# Patient Record
(demographics unavailable — no encounter records)

---

## 2024-11-05 NOTE — HISTORY OF PRESENT ILLNESS
[FreeTextEntry1] : Patient seen s/p right foot TMA revision, closed with sutures at this time. pt also seen for left foot diabetic ulcer down to skin, subcutaneous tissue, fat. Pt currently on IV abx for osteomyelitis. Patient relates that she is doing well at this time and is currently on IV antibiotics for osteomyelitis.

## 2024-11-05 NOTE — PLAN
[FreeTextEntry1] : Patient examined and evaluated at this time. Continue local wound care and offloading. IV abx as per infectious disease. Spent 30 minutes for patient care and medical decision making. Patient to follow up in 1 week.

## 2024-11-05 NOTE — PHYSICAL EXAM
[0] : left 0 [1+] : left 1+ [Skin Ulcer] : ulcer [Alert] : alert [Oriented to Person] : oriented to person [Oriented to Place] : oriented to place [Calm] : calm [Ankle Swelling (On Exam)] : not present [Varicose Veins Of Lower Extremities] : not present [] : not present [Purpura] : no purpura  [Petechiae] : no petechiae [Skin Induration] : no induration [de-identified] : A&Ox3, NAD, morbid obesity [de-identified] : HTN, HLD [de-identified] : Bilateral transmetatarsal amputation sites, healed.  Status post left Achilles tendon lengthening. [de-identified] : right foot tma revision site with intact sutures. left foot ulcer down to skin, subcutaneous tissue, fat. [de-identified] : Bilateral loss of light touch sensation [Abdominal Pad] : Abdominal Pad [4 x 4] : 4 x 4  [de-identified] : All sutures in place [FreeTextEntry1] : Right Foot [FreeTextEntry2] : 19.0 Suture line [de-identified] : Sanguinous  [de-identified] : TMA Revision 10/25/24 [de-identified] : Mild [de-identified] : Adaptic touch, Silver Alginate [de-identified] :  Mechanically cleansed with NS 0.9%, Sterile Gauze Kerlix Surgical Shoe   Dorsalis Pedis: R Palpable, Regular, 2+ L Palpable, Regular, 2+ Posterior Tibialis: R Palpable, Regular, 2+ L Palpable, Regular, 2+ Extremity Color: Pigmented Extremity Temperature: Warm Capillary Refill: < 3 seconds bilaterally Vascular cleared in house during surgery  [FreeTextEntry7] : Left Plantar Foot [FreeTextEntry8] : 0.3 [FreeTextEntry9] : 0.3 [de-identified] : 0.4 [de-identified] : Serosanguinous  [de-identified] : Silver Alginate [de-identified] :  Mechanically cleansed with NS 0.9%, Sterile Gauze Kerlix [TWNoteComboBox4] : Moderate [TWNoteComboBox5] : No [TWNoteComboBox6] : Surgical [de-identified] : No [de-identified] : Macerated [de-identified] : None [de-identified] : None [de-identified] : 3x Weekly [de-identified] : Primary Dressing [de-identified] : Moderate [de-identified] : No [de-identified] : Diabetic [de-identified] : No [de-identified] : Normal [de-identified] : None [de-identified] : None [de-identified] : 100% [de-identified] : 3x Weekly [de-identified] : Primary Dressing

## 2024-11-05 NOTE — REVIEW OF SYSTEMS
[Joint Stiffness] : joint stiffness [Skin Wound] : skin wound [Fever] : no fever [Chills] : no chills [Eye Pain] : no eye pain [Loss Of Hearing] : no hearing loss [Shortness Of Breath] : no shortness of breath [Wheezing] : no wheezing [Abdominal Pain] : no abdominal pain [Anxiety] : no anxiety [Easy Bleeding] : no tendency for easy bleeding [FreeTextEntry5] : HTN, HLD [FreeTextEntry9] : TMA bilateral , in equinus  [de-identified] : DFU 2 right plantar 1st metatarsal , , left foot DFU 2 plantar mid forefoot , no soi  [de-identified] : IDDM with neuropathy  [de-identified] : NIDDM

## 2024-11-05 NOTE — ASSESSMENT
[Verbal] : Verbal [Written] : Written [Demo] : Demo [Patient] : Patient [Good - alert, interested, motivated] : Good - alert, interested, motivated [Verbalizes knowledge/Understanding] : Verbalizes knowledge/understanding [Dressing changes] : dressing changes [Foot Care] : foot care [Skin Care] : skin care [Signs and symptoms of infection] : sign and symptoms of infection [Nutrition] : nutrition [How and When to Call] : how and when to call [Pain Management] : pain management [Off-loading] : off-loading [Patient responsibility to plan of care] : patient responsibility to plan of care [Glycemic Control] : glycemic control [] : Yes [Stable] : stable [Home] : Home [Faxed - Long Term Care/Home Health Agency] : Long Term Care/Home Health Agency: Faxed [FreeTextEntry2] : Infection prevention wound care Maintain optimal Skin Integrity to high pressure areas. Nutrition and Wound Healing Elevation and low sodium compliance Pressure relief/Pressure redistribution    [FreeTextEntry3] : Initial Visit [FreeTextEntry4] : Follow up in 1 week Patient is registered with [facility] for dressing changes, signed WC instructions and script for supplies sent over and copy of instructions given to patient. Pt stated she has some supplies at home. [Walker] : Walker [FreeTextEntry1] : [facility]

## 2024-11-09 NOTE — ASSESSMENT
[Verbal] : Verbal [Written] : Written [Demo] : Demo [Patient] : Patient [Good - alert, interested, motivated] : Good - alert, interested, motivated [Verbalizes knowledge/Understanding] : Verbalizes knowledge/understanding [Dressing changes] : dressing changes [Foot Care] : foot care [Skin Care] : skin care [Signs and symptoms of infection] : sign and symptoms of infection [Nutrition] : nutrition [How and When to Call] : how and when to call [Pain Management] : pain management [Off-loading] : off-loading [Patient responsibility to plan of care] : patient responsibility to plan of care [Glycemic Control] : glycemic control [Stable] : stable [Home] : Home [Walker] : Walker [Faxed - Long Term Care/Home Health Agency] : Long Term Care/Home Health Agency: Faxed [] : No [FreeTextEntry2] : Infection prevention wound care Maintain optimal Skin Integrity to high pressure areas. Nutrition and Wound Healing Elevation and low sodium compliance Pressure relief/Pressure redistribution    [FreeTextEntry3] : Pt with a large amount of sanguinous drainage for the last 2 days [FreeTextEntry4] : Pt to come to  for dressing changes Sat and Monday to be able to keep an eye on the new bleeding the patient is experiencing. Follow up on Wed for an assessment.  Patient is registered with [facility] for dressing changes, Pt called to notify them they do not need to come until Fri next week. [FreeTextEntry1] : [facility]

## 2024-11-09 NOTE — PLAN
[FreeTextEntry1] : Patient examined and evaluated at this time. Applied Nunit and silver alginate to the right foot. Advised patient to limit ambulation to only necessary activities with aid of walker and surgical shoe with weight bearing to the heel.  Continue local wound care and offloading. Patient to return to [facility] on Sat and Mon to assess for drainage vs bleeding from surgical site  IV abx as per infectious disease. Spent 20 minutes for patient care and medical decision making. Patient to follow up in 1 week.

## 2024-11-09 NOTE — HISTORY OF PRESENT ILLNESS
[FreeTextEntry1] : Patient seen s/p right foot TMA revision, closed with sutures at this time. pt also seen for left foot diabetic ulcer down to skin, subcutaneous tissue, fat. Pt currently on ICC line  IV abx for osteomyelitis. Patient relates that she walked to in the house and noticed blood through the dressings. Patient also relates the blood was pooling on the floors. Patient was seen at the [facility] today with strkethrough to the dressing.

## 2024-11-09 NOTE — REVIEW OF SYSTEMS
[Fever] : no fever [Chills] : no chills [Eye Pain] : no eye pain [Loss Of Hearing] : no hearing loss [Shortness Of Breath] : no shortness of breath [Wheezing] : no wheezing [Abdominal Pain] : no abdominal pain [Joint Stiffness] : joint stiffness [Skin Wound] : skin wound [Anxiety] : no anxiety [Easy Bleeding] : no tendency for easy bleeding [FreeTextEntry5] : HTN, HLD [FreeTextEntry9] : TMA bilateral , in equinus  [de-identified] : s/p revision TMA of the right foot and plantr left foot wound  [de-identified] : IDDM with neuropathy  [de-identified] : NIDDM

## 2024-11-09 NOTE — ASSESSMENT
[Verbal] : Verbal [Written] : Written [Demo] : Demo [Patient] : Patient [Good - alert, interested, motivated] : Good - alert, interested, motivated [Verbalizes knowledge/Understanding] : Verbalizes knowledge/understanding [Dressing changes] : dressing changes [Foot Care] : foot care [Skin Care] : skin care [Signs and symptoms of infection] : sign and symptoms of infection [Nutrition] : nutrition [How and When to Call] : how and when to call [Pain Management] : pain management [Off-loading] : off-loading [Patient responsibility to plan of care] : patient responsibility to plan of care [Glycemic Control] : glycemic control [Stable] : stable [Home] : Home [Walker] : Walker [Faxed - Long Term Care/Home Health Agency] : Long Term Care/Home Health Agency: Faxed [] : No [FreeTextEntry2] : Infection prevention wound care Maintain optimal Skin Integrity to high pressure areas. Nutrition and Wound Healing Elevation and low sodium compliance Pressure relief/Pressure redistribution    [FreeTextEntry3] : Pt with a large amount of sanguinous drainage for the last 2 days [FreeTextEntry4] : Pt to come to  for dressing changes Sat and Monday to be able to keep an eye on the new bleeding the patient is experiencing. Follow up on Wed for an assessment.  Patient is registered with [facility] for dressing changes, Pt called to notify them they do not need to come until Fri next week. [FreeTextEntry1] : [facility]

## 2024-11-09 NOTE — REVIEW OF SYSTEMS
[Fever] : no fever [Chills] : no chills [Eye Pain] : no eye pain [Loss Of Hearing] : no hearing loss [Shortness Of Breath] : no shortness of breath [Wheezing] : no wheezing [Abdominal Pain] : no abdominal pain [Joint Stiffness] : joint stiffness [Skin Wound] : skin wound [Anxiety] : no anxiety [Easy Bleeding] : no tendency for easy bleeding [FreeTextEntry5] : HTN, HLD [FreeTextEntry9] : TMA bilateral , in equinus  [de-identified] : s/p revision TMA of the right foot and plantr left foot wound  [de-identified] : IDDM with neuropathy  [de-identified] : NIDDM

## 2024-11-09 NOTE — PHYSICAL EXAM
[Abdominal Pad] : Abdominal Pad [4 x 4] : 4 x 4  [0] : left 0 [1+] : left 1+ [Ankle Swelling (On Exam)] : not present [Varicose Veins Of Lower Extremities] : not present [] : not present [Purpura] : no purpura  [Petechiae] : no petechiae [Skin Ulcer] : ulcer [Skin Induration] : no induration [Alert] : alert [Oriented to Person] : oriented to person [Oriented to Place] : oriented to place [Calm] : calm [de-identified] : A&Ox3, NAD, morbid obesity [de-identified] : HTN, HLD [de-identified] : Bilateral transmetatarsal amputation sites, healed.  Status post left Achilles tendon lengthening. [de-identified] : right foot TMA  revision site with central dehiscence, no active bleeding noted. left foot ulcer down to skin, subcutaneous tissue, fat. [de-identified] : Bilateral loss of light touch sensation [FreeTextEntry1] : Right Foot [FreeTextEntry2] : 19.0 Suture line [de-identified] : Sanguinous  [de-identified] : TMA Revision 10/25/24 [de-identified] : Mild [de-identified] : Nu-Knit, Silver Alginate [de-identified] :  Mechanically cleansed with NS 0.9%, [facility]    [FreeTextEntry7] : Left Plantar Foot [FreeTextEntry8] : 0.3 [FreeTextEntry9] : 0.3 [de-identified] : 0.4 [de-identified] : Serosanguinous  [de-identified] : Silver Alginate [de-identified] :  Mechanically cleansed with NS 0.9%, Sterile Gauze Kerlix [TWNoteComboBox4] : Large [TWNoteComboBox5] : No [TWNoteComboBox6] : Surgical [de-identified] : No [de-identified] : Macerated [de-identified] : None [de-identified] : None [de-identified] : 3x Weekly [de-identified] : Primary Dressing [de-identified] : Moderate [de-identified] : No [de-identified] : Diabetic [de-identified] : No [de-identified] : Normal [de-identified] : None [de-identified] : None [de-identified] : 100% [de-identified] : 3x Weekly [de-identified] : Primary Dressing

## 2024-11-09 NOTE — HISTORY OF PRESENT ILLNESS
[FreeTextEntry1] : Patient seen s/p right foot TMA revision, closed with sutures at this time. pt also seen for left foot diabetic ulcer down to skin, subcutaneous tissue, fat. Pt currently on ICC line  IV abx for osteomyelitis. Patient relates that she walked to in the house and noticed blood through the dressings. Patient also relates the blood was pooling on the floors. Patient was seen at the [facility] today with strkethrough to the dressing.

## 2024-11-09 NOTE — PHYSICAL EXAM
[Abdominal Pad] : Abdominal Pad [4 x 4] : 4 x 4  [0] : left 0 [1+] : left 1+ [Ankle Swelling (On Exam)] : not present [Varicose Veins Of Lower Extremities] : not present [] : not present [Purpura] : no purpura  [Petechiae] : no petechiae [Skin Ulcer] : ulcer [Skin Induration] : no induration [Alert] : alert [Oriented to Person] : oriented to person [Oriented to Place] : oriented to place [Calm] : calm [de-identified] : A&Ox3, NAD, morbid obesity [de-identified] : HTN, HLD [de-identified] : Bilateral transmetatarsal amputation sites, healed.  Status post left Achilles tendon lengthening. [de-identified] : right foot TMA  revision site with central dehiscence, no active bleeding noted. left foot ulcer down to skin, subcutaneous tissue, fat. [de-identified] : Bilateral loss of light touch sensation [FreeTextEntry1] : Right Foot [FreeTextEntry2] : 19.0 Suture line [de-identified] : Sanguinous  [de-identified] : TMA Revision 10/25/24 [de-identified] : Mild [de-identified] : Nu-Knit, Silver Alginate [de-identified] :  Mechanically cleansed with NS 0.9%, [facility]    [FreeTextEntry7] : Left Plantar Foot [FreeTextEntry8] : 0.3 [FreeTextEntry9] : 0.3 [de-identified] : 0.4 [de-identified] : Serosanguinous  [de-identified] : Silver Alginate [de-identified] :  Mechanically cleansed with NS 0.9%, Sterile Gauze Kerlix [TWNoteComboBox4] : Large [TWNoteComboBox5] : No [TWNoteComboBox6] : Surgical [de-identified] : No [de-identified] : Macerated [de-identified] : None [de-identified] : None [de-identified] : 3x Weekly [de-identified] : Primary Dressing [de-identified] : Moderate [de-identified] : No [de-identified] : Diabetic [de-identified] : No [de-identified] : Normal [de-identified] : None [de-identified] : None [de-identified] : 100% [de-identified] : 3x Weekly [de-identified] : Primary Dressing

## 2024-11-09 NOTE — PLAN
[FreeTextEntry1] : Patient examined and evaluated at this time. Applied Nunit and silver alginate to the right foot. Advised patient to limit ambulation to only necessary activities with aid of walker and surgical shoe with weight bearing to the heel.  Continue local wound care and offloading. Patient to return to [facility] on Sat and Mon to assess for drainage vs bleeding from surgical site  IV abx as per infectious disease. Spent 20 minutes for patient care and medical decision making. Patient to follow up in 1 week.

## 2024-11-22 NOTE — PLAN
[FreeTextEntry1] : Patient examined and evaluated at this time. Advised patient to limit ambulation to only necessary activities with aid of walker and surgical shoe with weight bearing to the heel.  Continue local wound care and offloading.  IV abx as per infectious disease. Spent 20 minutes for patient care and medical decision making. Patient to follow up in 1 week.

## 2024-11-22 NOTE — PHYSICAL EXAM
[4 x 4] : 4 x 4  [Abdominal Pad] : Abdominal Pad [0] : left 0 [1+] : left 1+ [Ankle Swelling (On Exam)] : not present [Varicose Veins Of Lower Extremities] : not present [] : not present [Purpura] : no purpura  [Petechiae] : no petechiae [Skin Ulcer] : ulcer [Skin Induration] : no induration [Alert] : alert [Oriented to Person] : oriented to person [Oriented to Place] : oriented to place [Calm] : calm [de-identified] : A&Ox3, NAD, morbid obesity [de-identified] : HTN, HLD [de-identified] : Bilateral transmetatarsal amputation sites, healed.  Status post left Achilles tendon lengthening. [de-identified] : right foot TMA  revision site with central dehiscence healed. left foot ulcer down to skin, subcutaneous tissue, fat. [de-identified] : Bilateral loss of light touch sensation [FreeTextEntry1] : Right foot TMA site - Sutures intact  [de-identified] : TMA Revision 10/25/24  [de-identified] : Dry flaky george wound  [de-identified] : Adaptic touch, Silver alginate [de-identified] : Mechanically cleansed with sterile gauze and normal saline. Kerlix   some sutures removed by DPM  [FreeTextEntry7] : left plantar foot  [FreeTextEntry8] : 0.3 [FreeTextEntry9] : 0.3 [de-identified] : 0.2  [de-identified] : scant Serous/sanguinous [de-identified] : callus  [de-identified] : Allevyn pad  [de-identified] : Mechanically cleansed with sterile gauze and normal saline. Cloth tape   Callus shaved by LACEY  [TWNoteComboBox4] : None [TWNoteComboBox6] : Surgical [de-identified] : other [de-identified] : None [de-identified] : None [de-identified] : No [de-identified] : Every other day [de-identified] : Primary Dressing [de-identified] : other [de-identified] : None [de-identified] : None [de-identified] : 100% [de-identified] : No [de-identified] : Every other day [de-identified] : Primary Dressing

## 2024-11-22 NOTE — REVIEW OF SYSTEMS
[Fever] : no fever [Chills] : no chills [Eye Pain] : no eye pain [Loss Of Hearing] : no hearing loss [Shortness Of Breath] : no shortness of breath [Wheezing] : no wheezing [Abdominal Pain] : no abdominal pain [Joint Stiffness] : joint stiffness [Skin Wound] : skin wound [Anxiety] : no anxiety [Easy Bleeding] : no tendency for easy bleeding [FreeTextEntry5] : HTN, HLD [FreeTextEntry9] : TMA bilateral , in equinus  [de-identified] : s/p revision TMA of the right foot and plantr left foot wound  [de-identified] : IDDM with neuropathy  [de-identified] : NIDDM

## 2024-11-22 NOTE — ASSESSMENT
[Verbal] : Verbal [Written] : Written [Demo] : Demo [Patient] : Patient [Good - alert, interested, motivated] : Good - alert, interested, motivated [Needs reinforcement] : needs reinforcement [Dressing changes] : dressing changes [Foot Care] : foot care [Skin Care] : skin care [Signs and symptoms of infection] : sign and symptoms of infection [Nutrition] : nutrition [How and When to Call] : how and when to call [Pain Management] : pain management [Patient responsibility to plan of care] : patient responsibility to plan of care [Glycemic Control] : glycemic control [] : Yes [Stable] : stable [Home] : Home [Ambulatory] : Ambulatory [Not Applicable - Long Term Care/Home Health Agency] : Long Term Care/Home Health Agency: Not Applicable [FreeTextEntry2] : Infection prevention Localized wound care  Goal remaining pain free regarding wounds Offloading  Weight loss   Promote optimal skin care and nutrition. [FreeTextEntry4] : Pt provides her own dressing changes and has supplies at home.  Follow up in 1 week

## 2024-11-22 NOTE — HISTORY OF PRESENT ILLNESS
[FreeTextEntry1] : Patient seen s/p right foot TMA revision, closed with sutures at this time. pt also seen for left foot diabetic ulcer down to skin, subcutaneous tissue, fat. Pt currently on ICC line  IV abx for osteomyelitis.

## 2024-11-30 NOTE — REVIEW OF SYSTEMS
[Fever] : no fever [Chills] : no chills [Eye Pain] : no eye pain [Loss Of Hearing] : no hearing loss [Shortness Of Breath] : no shortness of breath [Wheezing] : no wheezing [Abdominal Pain] : no abdominal pain [Joint Stiffness] : joint stiffness [Skin Wound] : skin wound [Anxiety] : no anxiety [Easy Bleeding] : no tendency for easy bleeding [FreeTextEntry5] : HTN, HLD [FreeTextEntry9] : TMA bilateral , in equinus  [de-identified] : s/p revision TMA of the right foot and plantr left foot wound  [de-identified] : IDDM with neuropathy  [de-identified] : NIDDM

## 2024-11-30 NOTE — PLAN
PROGRAM GOALS from SURGEON    Anjali has the potential to become an appropriate candidate for weight loss surgery.  They have chosen the sleeve gastrectomy surgery 43410 and I agree.  They must fulfill the following goals in order to undergo bariatric surgery:    Psychology Evaluation- You will need to see a clinical Psychologist or a Psychiatrist in order to be cleared for weight loss surgery.  Please call Psychologist or licensed clinical  Jason Nava and team, [place] [phone], Jennifer Liu and team, [place] [phone], Zach Muhammad [place] [phone], Skip Syed and team, [place] [phone], Noel Casanova and team, [place] [phone], Jordan Desir, [place] [phone], or Alex Victoria [place] [phone]    Follow Up Visits - Your follow-up care is very important to the success of your procedure.  We will see you monthly and then:  For the gastric bypass and sleeve---Year 1 - At least 6 times (1-2 weeks, 1, 3, 6, 9 and 12 months after surgery).  ---Year 2 - At least 3-4 times  Labs: you will need labs to check for iron or vitamin deficiencies every 6 months for the first 2 years and then yearly.  For the Lap-BAND  ---Year 1 - Approximately 9 times on the average (the additional visits are to adjust the BAND and also: 1-2 weeks,1, 2, 3, 6, 9 and 12 months after surgery)  ---Year 2 - At least 3-4 times  ---Labs: you will need labs to check for iron or vitamin deficiencies every 6 months for the first 2 years and then yearly     Subsequent Years for the Gastric bypass, Sleeve or Lap-BAND - At 1-2 times Annually     Pills - You will not be able to take any solid pills for six months after surgery as your new stomach heals.  Pills include prescription medications, over the counter medications (like Tylenol) as well as vitamins.  The only exception would be very small pills such as birth control pills or Synthroid which are  approximately 3/8 inch in diameter or smaller for 6 months then 3/4 of an inch for the rest of your life .        TO DO LIST:    EKG prior to surgery This is good for 6 months  You will need an upper endoscopy with Dr. El Johnson at [facility].  We will help set this up for you.  Laboratory Blood Tests - Labs have been entered in Epic.  Please get these tests done but you need to be :12 HOUR FASTING AND NO VITAMINS FOR 3 DAYS  Pulmonary Function test   Indirect Calorimetry-   Please schedule at [facility], [facility], [facility] or our office  Chest X-Ray - You will need to have a chest x-ray taken Prior to surgery This is good for 6 months  Ultrasound of the Gallbladder - Please obtain an Ultrasound of the Gallbladder prior to surgery  We need your CPAP settings    The following tests and labs are in EPIC:  No orders of the defined types were placed in this encounter.     Pregnancy - You are at an increased risk for complications during pregnancy after surgery mainly due to decreased calorie intake and nutritional changes.  This risk is most pronounced during the first two years after weight loss surgery and it is recommended that you do not get pregnant during this time.  After two years it is possible to have a normal pregnancy; however, it is suggested that you consult with your obstetrician and a dietitian prior to becoming pregnant to discuss a nutritional plan with respect to your limited food and vitamin intake.   If you had any bariatric surgery you should call our office as soon as you are aware you are pregnant.      Informational - Be aware of the following key points:    Goals - If your goals are not met, your surgery date may be cancelled or postponed until they are completed.  Working after surgery - The normal time off from work is 1-2 full weeks.  If you require a note for work please let us know.  Lifting after surgery - You will not be able to lift  more than 20 lb for at least 4 weeks after surgery.    Surgical date - After your insurance has cleared, you will be contacted (normally by phone) by a member of our office to schedule your surgery.   Final clearance of your surgical procedure is dependent on:  Completion of your goals    Insurance clearance    Hospital admitting interview    Paperwork (read this section carefully) - For non-[facility] tests/labs or other results. You are required to bring copies of all paperwork to the next visit.  Do not rely on any other doctors’ offices, hospitals or facilities to mail or Fax test results to our office.  It is your obligation to obtain copies, and bring them to the next visit even if other facilities mail or fax copies to our office.  Please bring a copy of your exercise and food records also.     Please understand that if these items are not completed within a reasonable time before your surgical date, your surgery will be postponed pending completion.     You will see us next month then please see your primary care monthly for the next 4 months and then you will see us.  Please discuss with your primary care that they should include your weight, eating plan and activity and exercise.      You will need an upper endoscopy with Dr. El Johnson at [facility].  We will help set this up for you.     Nutrition Goals:  1. Review the Pre-Operative Nutrition Plan/Packet.  2. Consider tracking in an angel like [employer] or [employer]. Aim to log 3-4 complete days before your next appointment so that we can review them together during your next appointment.  3. Aim to eat 5-6 small, high protein meals/snacks every 2-3 hours per day - do not skip.  4. Eliminate juice. Eat whole fruit instead.    EXERCISE PLAN:  An exercise plan was discussed with the doctor for Anjali to add an additional exercise such as cross training between pulleys, hammer strength exercises, and kettle bell training for functional strength.      GOALS:  Cross train between treadmill, elliptical , and rowing machine followed by circuit exercises, pulleys, kettle bell training, and hammer strength exercises for resistance.      THR:  135-155 beats/min during cardiovascular exercise:      We will discuss scheduling for an RMR to test metabolism by next follow up.    [FreeTextEntry1] : Patient examined and evaluated at this time. Advised patient to limit ambulation to only necessary activities with aid of walker and surgical shoe with weight bearing to the heel.  Continue local wound care and offloading.  IV abx as per infectious disease. Spent 20 minutes for patient care and medical decision making. Patient to follow up in 1 week.

## 2024-11-30 NOTE — PHYSICAL EXAM
[4 x 4] : 4 x 4  [Abdominal Pad] : Abdominal Pad [0] : left 0 [1+] : left 1+ [Ankle Swelling (On Exam)] : not present [Varicose Veins Of Lower Extremities] : not present [] : not present [Purpura] : no purpura  [Petechiae] : no petechiae [Skin Ulcer] : ulcer [Skin Induration] : no induration [Alert] : alert [Oriented to Person] : oriented to person [Oriented to Place] : oriented to place [Calm] : calm [de-identified] : A&Ox3, NAD, morbid obesity [de-identified] : HTN, HLD [de-identified] : Bilateral transmetatarsal amputation sites, healed.  Status post left Achilles tendon lengthening. [de-identified] : right foot TMA  revision site with central dehiscence sutures intact to the rest of the foot.  left foot wound plantar aspect with granular wound base             ulcer down to skin, subcutaneous tissue, fat. [de-identified] : Bilateral loss of light touch sensation [FreeTextEntry1] : Right foot TMA site - Sutures intact  [de-identified] : TMA Revision 10/25/24  [de-identified] : Dry flaky george wound  [de-identified] : Adaptic touch, Silver alginate [de-identified] : Mechanically cleansed with sterile gauze and normal saline. Kerlix   some sutures removed by DPM  [FreeTextEntry7] : left plantar foot  [FreeTextEntry8] : 0.3 [FreeTextEntry9] : 0.3 [de-identified] : 0.2  [de-identified] : scant Serous/sanguinous [de-identified] : callus  [de-identified] : Allevyn pad  [de-identified] : Mechanically cleansed with sterile gauze and normal saline.  Cloth tape   Callus shaved by LACEY  [TWNoteComboBox4] : None [TWNoteComboBox6] : Surgical [de-identified] : other [de-identified] : None [de-identified] : None [de-identified] : No [de-identified] : Every other day [de-identified] : Primary Dressing [de-identified] : other [de-identified] : None [de-identified] : None [de-identified] : 100% [de-identified] : No [de-identified] : Every other day [de-identified] : Primary Dressing

## 2024-11-30 NOTE — VITALS
[Pain related to present condition?] : The patient's  pain is not related to present condition. [] : No [de-identified] : Patient has no pain related to wounds.

## 2024-11-30 NOTE — ASSESSMENT
[Verbal] : Verbal [Written] : Written [Demo] : Demo [Patient] : Patient [Good - alert, interested, motivated] : Good - alert, interested, motivated [Needs reinforcement] : needs reinforcement [Dressing changes] : dressing changes [Foot Care] : foot care [Skin Care] : skin care [Signs and symptoms of infection] : sign and symptoms of infection [Nutrition] : nutrition [How and When to Call] : how and when to call [Pain Management] : pain management [Patient responsibility to plan of care] : patient responsibility to plan of care [Glycemic Control] : glycemic control [Stable] : stable [Home] : Home [Ambulatory] : Ambulatory [Faxed - Long Term Care/Home Health Agency] : Long Term Care/Home Health Agency: Faxed [] : No [FreeTextEntry2] : Infection prevention Localized wound care  Goal remaining pain free regarding wounds Offloading  Weight loss   Promote optimal skin care and nutrition. [FreeTextEntry4] : Pt states the PICC line will be removed on 12/6/24 Pt provides her own dressing changes and has supplies at home but states that the HC nurse from [facility] has been changing the dressing. Wc instructions faxed today  Follow up in 1 week  [FreeTextEntry1] : [facility]

## 2024-11-30 NOTE — HISTORY OF PRESENT ILLNESS
[FreeTextEntry1] : Patient is 39 year old female presenting  s/p right foot TMA revision, closed with sutures at this time. pt also seen for left foot diabetic ulcer down to skin, subcutaneous tissue, fat. Pt currently on PICC line  IV abx for osteomyelitis.

## 2024-11-30 NOTE — PHYSICAL EXAM
[4 x 4] : 4 x 4  [Abdominal Pad] : Abdominal Pad [0] : left 0 [1+] : left 1+ [Ankle Swelling (On Exam)] : not present [Varicose Veins Of Lower Extremities] : not present [] : not present [Purpura] : no purpura  [Petechiae] : no petechiae [Skin Ulcer] : ulcer [Skin Induration] : no induration [Alert] : alert [Oriented to Person] : oriented to person [Oriented to Place] : oriented to place [Calm] : calm [de-identified] : A&Ox3, NAD, morbid obesity [de-identified] : HTN, HLD [de-identified] : Bilateral transmetatarsal amputation sites, healed.  Status post left Achilles tendon lengthening. [de-identified] : right foot TMA  revision site with central dehiscence sutures intact to the rest of the foot.  left foot wound plantar aspect with granular wound base             ulcer down to skin, subcutaneous tissue, fat. [de-identified] : Bilateral loss of light touch sensation [FreeTextEntry1] : Right foot TMA site - Sutures intact  [de-identified] : TMA Revision 10/25/24  [de-identified] : Dry flaky george wound  [de-identified] : Adaptic touch, Silver alginate [de-identified] : Mechanically cleansed with sterile gauze and normal saline. Kerlix   some sutures removed by DPM  [FreeTextEntry7] : left plantar foot  [FreeTextEntry8] : 0.3 [FreeTextEntry9] : 0.3 [de-identified] : 0.2  [de-identified] : scant Serous/sanguinous [de-identified] : callus  [de-identified] : Allevyn pad  [de-identified] : Mechanically cleansed with sterile gauze and normal saline.  Cloth tape   Callus shaved by LACEY  [TWNoteComboBox4] : None [TWNoteComboBox6] : Surgical [de-identified] : other [de-identified] : None [de-identified] : None [de-identified] : No [de-identified] : Every other day [de-identified] : Primary Dressing [de-identified] : other [de-identified] : None [de-identified] : None [de-identified] : 100% [de-identified] : No [de-identified] : Every other day [de-identified] : Primary Dressing

## 2024-11-30 NOTE — ASSESSMENT
[Verbal] : Verbal [Written] : Written [Demo] : Demo [Patient] : Patient [Good - alert, interested, motivated] : Good - alert, interested, motivated [Needs reinforcement] : needs reinforcement [Dressing changes] : dressing changes [Foot Care] : foot care [Skin Care] : skin care [Signs and symptoms of infection] : sign and symptoms of infection [Nutrition] : nutrition [How and When to Call] : how and when to call [Pain Management] : pain management [Patient responsibility to plan of care] : patient responsibility to plan of care [Glycemic Control] : glycemic control [Stable] : stable [Home] : Home [Ambulatory] : Ambulatory [Faxed - Long Term Care/Home Health Agency] : Long Term Care/Home Health Agency: Faxed [] : No [FreeTextEntry2] : Infection prevention Localized wound care  Goal remaining pain free regarding wounds Offloading  Weight loss   Promote optimal skin care and nutrition. [FreeTextEntry4] : Pt states the PICC line will be removed on 12/6/24 Pt provides her own dressing changes and has supplies at home but states that the HC nurse from [facility] has been changing the dressing. Wc instructions faxed today  Follow up in 1 week  [FreeTextEntry1] : [facility]

## 2024-11-30 NOTE — REVIEW OF SYSTEMS
[Fever] : no fever [Chills] : no chills [Eye Pain] : no eye pain [Loss Of Hearing] : no hearing loss [Shortness Of Breath] : no shortness of breath [Wheezing] : no wheezing [Abdominal Pain] : no abdominal pain [Joint Stiffness] : joint stiffness [Skin Wound] : skin wound [Anxiety] : no anxiety [Easy Bleeding] : no tendency for easy bleeding [FreeTextEntry5] : HTN, HLD [FreeTextEntry9] : TMA bilateral , in equinus  [de-identified] : s/p revision TMA of the right foot and plantr left foot wound  [de-identified] : IDDM with neuropathy  [de-identified] : NIDDM

## 2024-12-10 NOTE — REVIEW OF SYSTEMS
[Fever] : no fever [Chills] : no chills [Eye Pain] : no eye pain [Loss Of Hearing] : no hearing loss [Shortness Of Breath] : no shortness of breath [Wheezing] : no wheezing [Abdominal Pain] : no abdominal pain [Joint Stiffness] : joint stiffness [Skin Wound] : skin wound [Anxiety] : no anxiety [Easy Bleeding] : no tendency for easy bleeding [FreeTextEntry5] : HTN, HLD [FreeTextEntry9] : TMA bilateral , in equinus  [de-identified] : s/p revision TMA of the right foot and plantr left foot wound  [de-identified] : IDDM with neuropathy  [de-identified] : NIDDM

## 2024-12-10 NOTE — PHYSICAL EXAM
[0] : left 0 [1+] : left 1+ [Ankle Swelling (On Exam)] : not present [Varicose Veins Of Lower Extremities] : not present [] : not present [Purpura] : no purpura  [Petechiae] : no petechiae [Skin Ulcer] : ulcer [Skin Induration] : no induration [Alert] : alert [Oriented to Person] : oriented to person [Oriented to Place] : oriented to place [Calm] : calm [de-identified] : A&Ox3, NAD, morbid obesity [de-identified] : HTN, HLD [de-identified] : Bilateral transmetatarsal amputation sites, healed.  Status post left Achilles tendon lengthening. [de-identified] : right foot TMA  revision site with central dehiscence sutures intact to the rest of the foot.  left foot wound plantar aspect with granular wound base             ulcer down to skin, subcutaneous tissue, fat. [de-identified] : Bilateral loss of light touch sensation [FreeTextEntry1] : Right foot TMA site [de-identified] : TMA Revision 10/25/24  [de-identified] : Dry flaky george wound  [de-identified] : No product [de-identified] : Mechanically cleansed with sterile gauze and normal saline.  Scab removed, closed beneath [FreeTextEntry7] : left plantar foot  [FreeTextEntry8] : 0.3 [FreeTextEntry9] : 0.3 [de-identified] : 0.2  [de-identified] : scant Serous/sanguinous [de-identified] : callus  [de-identified] : Allevyn pad  [de-identified] : Mechanically cleansed with sterile gauze and normal saline.  Cloth tape   Callus shaved by LACEY  [TWNoteComboBox4] : None [TWNoteComboBox6] : Surgical [de-identified] : other [de-identified] : None [de-identified] : None [de-identified] : No [de-identified] : other [de-identified] : None [de-identified] : None [de-identified] : 100% [de-identified] : No [de-identified] : Every other day [de-identified] : Primary Dressing

## 2024-12-10 NOTE — ASSESSMENT
[Verbal] : Verbal [Written] : Written [Demo] : Demo [Patient] : Patient [Good - alert, interested, motivated] : Good - alert, interested, motivated [Needs reinforcement] : needs reinforcement [Dressing changes] : dressing changes [Foot Care] : foot care [Skin Care] : skin care [Signs and symptoms of infection] : sign and symptoms of infection [Nutrition] : nutrition [How and When to Call] : how and when to call [Pain Management] : pain management [Patient responsibility to plan of care] : patient responsibility to plan of care [Glycemic Control] : glycemic control [Stable] : stable [Home] : Home [Ambulatory] : Ambulatory [Faxed - Long Term Care/Home Health Agency] : Long Term Care/Home Health Agency: Faxed [] : No [FreeTextEntry2] : Infection prevention Localized wound care  Goal remaining pain free regarding wounds Offloading  Weight loss   Promote optimal skin care and nutrition. [FreeTextEntry4] : Pt has [facility] for dressing changes, signed WC instructions sent over  Follow up in 2 weeks [FreeTextEntry1] : [facility]

## 2024-12-10 NOTE — REVIEW OF SYSTEMS
[Fever] : no fever [Chills] : no chills [Eye Pain] : no eye pain [Loss Of Hearing] : no hearing loss [Shortness Of Breath] : no shortness of breath [Wheezing] : no wheezing [Abdominal Pain] : no abdominal pain [Joint Stiffness] : joint stiffness [Skin Wound] : skin wound [Anxiety] : no anxiety [Easy Bleeding] : no tendency for easy bleeding [FreeTextEntry5] : HTN, HLD [FreeTextEntry9] : TMA bilateral , in equinus  [de-identified] : s/p revision TMA of the right foot and plantr left foot wound  [de-identified] : IDDM with neuropathy  [de-identified] : NIDDM

## 2024-12-10 NOTE — PLAN
PATIENT RESTING COMFORTABLY IN BED, BREATHING IS EVEN AND UNLABORED. O2
SATURATION IS 95% ON 1L O2. CALL LIGHT WITHIN REACH. [FreeTextEntry1] : Patient examined and evaluated at this time. Continue local wound care and offloading.  All sutures removed from the right foot at this time. Spent 20 minutes for patient care and medical decision making. Patient to follow up in 2 weeks.

## 2024-12-10 NOTE — VITALS
[Pain related to present condition?] : The patient's  pain is not related to present condition. [] : No [de-identified] : Patient has no pain related to wounds.

## 2024-12-10 NOTE — PLAN
[FreeTextEntry1] : Patient examined and evaluated at this time. Continue local wound care and offloading.  All sutures removed from the right foot at this time. Spent 20 minutes for patient care and medical decision making. Patient to follow up in 2 weeks.

## 2024-12-10 NOTE — HISTORY OF PRESENT ILLNESS
[FreeTextEntry1] : Patient is 39 year old female presenting  s/p right foot TMA revision, closed with sutures at this time. pt also seen for left foot diabetic ulcer down to skin, subcutaneous tissue, fat.

## 2024-12-10 NOTE — PHYSICAL EXAM
[0] : left 0 [1+] : left 1+ [Ankle Swelling (On Exam)] : not present [Varicose Veins Of Lower Extremities] : not present [] : not present [Purpura] : no purpura  [Petechiae] : no petechiae [Skin Ulcer] : ulcer [Skin Induration] : no induration [Alert] : alert [Oriented to Person] : oriented to person [Oriented to Place] : oriented to place [Calm] : calm [de-identified] : A&Ox3, NAD, morbid obesity [de-identified] : HTN, HLD [de-identified] : Bilateral transmetatarsal amputation sites, healed.  Status post left Achilles tendon lengthening. [de-identified] : right foot TMA  revision site with central dehiscence sutures intact to the rest of the foot.  left foot wound plantar aspect with granular wound base             ulcer down to skin, subcutaneous tissue, fat. [de-identified] : Bilateral loss of light touch sensation [FreeTextEntry1] : Right foot TMA site [de-identified] : TMA Revision 10/25/24  [de-identified] : Dry flaky george wound  [de-identified] : No product [de-identified] : Mechanically cleansed with sterile gauze and normal saline.  Scab removed, closed beneath [FreeTextEntry7] : left plantar foot  [FreeTextEntry8] : 0.3 [FreeTextEntry9] : 0.3 [de-identified] : 0.2  [de-identified] : scant Serous/sanguinous [de-identified] : callus  [de-identified] : Allevyn pad  [de-identified] : Mechanically cleansed with sterile gauze and normal saline.  Cloth tape   Callus shaved by LACEY  [TWNoteComboBox4] : None [TWNoteComboBox6] : Surgical [de-identified] : other [de-identified] : None [de-identified] : None [de-identified] : No [de-identified] : other [de-identified] : None [de-identified] : None [de-identified] : 100% [de-identified] : No [de-identified] : Every other day [de-identified] : Primary Dressing

## 2024-12-10 NOTE — VITALS
[Pain related to present condition?] : The patient's  pain is not related to present condition. [] : No [de-identified] : Patient has no pain related to wounds.

## 2024-12-20 NOTE — PHYSICAL EXAM
[0] : left 0 [1+] : left 1+ [Ankle Swelling (On Exam)] : not present [Varicose Veins Of Lower Extremities] : not present [] : not present [Purpura] : no purpura  [Petechiae] : no petechiae [Skin Ulcer] : ulcer [Skin Induration] : no induration [Alert] : alert [Oriented to Person] : oriented to person [Oriented to Place] : oriented to place [Calm] : calm [de-identified] : A&Ox3, NAD, morbid obesity [de-identified] : HTN, HLD [de-identified] : Bilateral transmetatarsal amputation.  Status post left Achilles tendon lengthening. [de-identified] : right foot TMA revision site healed. left foot wound plantar aspect with granular wound base         ulcer down to skin, subcutaneous tissue, fat. [de-identified] : Bilateral loss of light touch sensation [FreeTextEntry1] : Right foot TMA site [de-identified] : TMA Revision 10/25/24  [de-identified] : Dry flaky george wound  [de-identified] : No product [de-identified] : Mechanically cleansed with sterile gauze and normal saline.  Scab removed, closed beneath [FreeTextEntry7] : left plantar foot  [FreeTextEntry8] : 0.3 [FreeTextEntry9] : 0.3 [de-identified] : 0.2  [de-identified] : Serous/sanguinous [de-identified] : callus  [de-identified] : Silver Alginate [de-identified] : Mechanically cleansed with sterile gauze and normal saline.  Cloth tape   Callus shaved by LACEY  [TWNoteComboBox4] : None [TWNoteComboBox6] : Surgical [de-identified] : other [de-identified] : None [de-identified] : None [de-identified] : No [de-identified] : Moderate [de-identified] : No [de-identified] : Diabetic [de-identified] : No [de-identified] : other [de-identified] : None [de-identified] : None [de-identified] : 100% [de-identified] : No [de-identified] : Primary Dressing [de-identified] : Every other day

## 2024-12-20 NOTE — VITALS
[Pain related to present condition?] : The patient's  pain is not related to present condition. [] : No [de-identified] : Patient has no pain related to wounds.

## 2024-12-20 NOTE — ASSESSMENT
[Verbal] : Verbal [Written] : Written [Demo] : Demo [Patient] : Patient [Good - alert, interested, motivated] : Good - alert, interested, motivated [Needs reinforcement] : needs reinforcement [Dressing changes] : dressing changes [Foot Care] : foot care [Skin Care] : skin care [Signs and symptoms of infection] : sign and symptoms of infection [Nutrition] : nutrition [How and When to Call] : how and when to call [Pain Management] : pain management [Patient responsibility to plan of care] : patient responsibility to plan of care [Glycemic Control] : glycemic control [Stable] : stable [Home] : Home [Ambulatory] : Ambulatory [Not Applicable - Long Term Care/Home Health Agency] : Long Term Care/Home Health Agency: Not Applicable [] : No [FreeTextEntry2] : Infection prevention Localized wound care  Goal remaining pain free regarding wounds Offloading  Weight loss   Promote optimal skin care and nutrition. [FreeTextEntry4] : Pt will cancel HC and will perform her own dressing changes, no supplies needed. Follow up in 2 weeks

## 2024-12-20 NOTE — REVIEW OF SYSTEMS
[Fever] : no fever [Chills] : no chills [Eye Pain] : no eye pain [Loss Of Hearing] : no hearing loss [Shortness Of Breath] : no shortness of breath [Wheezing] : no wheezing [Abdominal Pain] : no abdominal pain [Joint Stiffness] : joint stiffness [Skin Wound] : skin wound [Anxiety] : no anxiety [Easy Bleeding] : no tendency for easy bleeding [FreeTextEntry5] : HTN, HLD [FreeTextEntry9] : TMA bilateral , in equinus  [de-identified] : s/p revision TMA of the right foot and plantr left foot wound  [de-identified] : IDDM with neuropathy  [de-identified] : NIDDM

## 2024-12-20 NOTE — PLAN
[FreeTextEntry1] : Patient examined and evaluated at this time. Continue local wound care and offloading.  Spent 20 minutes for patient care and medical decision making. Patient to follow up in 2 weeks.

## 2025-01-02 NOTE — ASSESSMENT
[Patient Requires Further Testing] : Patient requires further testing [No Further Testing Recommended] : no further testing recommended [FreeTextEntry4] : Pt currently in the process of being cleared. Will need further labs to be completely optimized for surgery  #Pre-OP Clearance - Ordered scripts for routine bloodwork including: cbc, cmp, lipid profile, a1c, and tsh with reflex. A1C last visit was elevated, will f/u with new A1C level, however of note, isrrael increased dose of Mounjaro from 2.5 to 5 recently about 1wk ago. New labs may not reflect change as of yet.  - EKG in office today wnl  - Unable to optimize today due to incomplete labwork.   Diabetes - Pt follows endo, Dr. Perlman - Mounjaro dose increased 2.5 to 5, continue current regimen - c/w insulin - Will f/u A1C    HTN - Elevated /86 in office today - C/w amlodipine, statin, labetalol  - follows Dr. Iverson, for cardiology  - cleared for surgery from cardiac standpoint, only thing left was ekg which was done in office today

## 2025-01-02 NOTE — REVIEW OF SYSTEMS
[Fever] : no fever [Chills] : no chills [Fatigue] : no fatigue [Chest Pain] : no chest pain [Palpitations] : no palpitations [Leg Claudication] : no leg claudication [Orthopnea] : no orthopnea [Shortness Of Breath] : no shortness of breath [Wheezing] : no wheezing [Cough] : no cough [Dyspnea on Exertion] : no dyspnea on exertion [Abdominal Pain] : no abdominal pain [Nausea] : no nausea [Constipation] : no constipation [Diarrhea] : diarrhea [Vomiting] : no vomiting [Joint Pain] : no joint pain [Joint Stiffness] : no joint stiffness [Joint Swelling] : no joint swelling [Headache] : no headache [Dizziness] : no dizziness [Fainting] : no fainting [Confusion] : no confusion

## 2025-01-02 NOTE — PHYSICAL EXAM
[No Acute Distress] : no acute distress [Well Nourished] : well nourished [Well Developed] : well developed [Well-Appearing] : well-appearing [Normal Sclera/Conjunctiva] : normal sclera/conjunctiva [PERRL] : pupils equal round and reactive to light [EOMI] : extraocular movements intact [Normal Outer Ear/Nose] : the outer ears and nose were normal in appearance [Normal Oropharynx] : the oropharynx was normal [No JVD] : no jugular venous distention [No Lymphadenopathy] : no lymphadenopathy [Supple] : supple [Thyroid Normal, No Nodules] : the thyroid was normal and there were no nodules present [No Respiratory Distress] : no respiratory distress  [No Accessory Muscle Use] : no accessory muscle use [Clear to Auscultation] : lungs were clear to auscultation bilaterally [Normal Rate] : normal rate  [Regular Rhythm] : with a regular rhythm [Normal S1, S2] : normal S1 and S2 [No Murmur] : no murmur heard [No Edema] : there was no peripheral edema [No Palpable Aorta] : no palpable aorta [Soft] : abdomen soft [Non Tender] : non-tender [Non-distended] : non-distended [No Masses] : no abdominal mass palpated [No HSM] : no HSM [Normal Bowel Sounds] : normal bowel sounds [No Rash] : no rash [Coordination Grossly Intact] : coordination grossly intact [No Focal Deficits] : no focal deficits [Normal Gait] : normal gait [Deep Tendon Reflexes (DTR)] : deep tendon reflexes were 2+ and symmetric [Normal Affect] : the affect was normal [Normal Insight/Judgement] : insight and judgment were intact

## 2025-01-02 NOTE — END OF VISIT
[] : Resident [FreeTextEntry3] : I was present with the Resident during the key portions of the history and exam. I discussed the case with the Resident and agree with the findings and plan as documented in the Resident 's note, unless noted below.

## 2025-01-02 NOTE — HISTORY OF PRESENT ILLNESS
[Coronary Artery Disease] : coronary artery disease [Sleep Apnea] : sleep apnea [Diabetes] : diabetes [(Patient denies any chest pain, claudication, dyspnea on exertion, orthopnea, palpitations or syncope)] : Patient denies any chest pain, claudication, dyspnea on exertion, orthopnea, palpitations or syncope [Moderate (4-6 METs)] : Moderate (4-6 METs) [Aortic Stenosis] : no aortic stenosis [Atrial Fibrillation] : no atrial fibrillation [Recent Myocardial Infarction] : no recent myocardial infarction [Implantable Device/Pacemaker] : no implantable device/pacemaker [Asthma] : no asthma [COPD] : no COPD [Smoker] : not a smoker [Family Member] : no family member with adverse anesthesia reaction/sudden death [Self] : no previous adverse anesthesia reaction [Chronic Anticoagulation] : no chronic anticoagulation [Chronic Kidney Disease] : no chronic kidney disease [FreeTextEntry4] : Pt presents today for an EKG that is needed for bariatric surgery. She also needs a signature on a form that she brought in stating that she has completed her pre-op workup and that her decision for bariatric surgery has been supported. Pt states that she has received clearance from Dr. Iverson, cardiology over phone about two weeks ago who said her stress test was fine but just needs an EKG to be done in person. Date of bariatric surgery has not been scheduled yet. Pt has also not followed up with pulm after last pre-op visit here at the clinic in September but says she does have her cpap machine at home. Was told she needs to use it every night for four hours but has been having difficulty keeping up with the schedule.  Pt did see isrrael, Dr. Perlman, one week ago at that time was sent for labs but patient did not obtain those labs since she lost the orders. Her Mounjaro was increased from 2.5 to 5 mg. Denies any difficulty climbing stairs/performing activities on her own. Follows with Podiatry Dr. Van, a wound care nurse for chronic feet wounds from T2DM.

## 2025-02-28 NOTE — DISCUSSION/SUMMARY
[FreeTextEntry1] : Clara has multiple risk factors for atherosclerosis, but does not have any established heart disease.    Blood work was performed in December 2023.  This revealed a total cholesterol of 245, HDL 48, triglycerides 253, calculated .  Echocardiography was performed January 30, 2024.  This was a difficult study.  Segmental wall motion abnormalities could not be excluded, despite the use of Definity.  LV systolic function appeared grossly normal.  Nuclear stress testing was performed February 2, 2024.  This revealed basal anterolateral and mid anterolateral defects, which were reversible, suggestive of ischemia.  The ejection fraction was 81%.  Coronary CT angiography was performed September 19, 2024 but was nondiagnostic due to significant cardiac motion.  An exercise stress echo was performed October 11, 2024.  She had a severely decreased functional capacity, prompting termination of the test after 35 seconds, though she did reach a diagnostic heart rate, without evidence of ischemia.   Unfortunately, despite her young age, she is quite debilitated, with a very poor exercise capacity.  Some of this is on the basis of anxiety.  She has had a number of cardiovascular examinations, all of which have had some difficulty in their interpretation.  Overall, does not seem as if she has any meaningful underlying structural heart disease.  I do think her exercise capacity is poor, largely on the basis of deconditioning and obesity.  I will increase labetalol up to 300 mg twice daily, given her suboptimal blood pressure control.  I consider her otherwise optimized from a cardiovascular perspective for her planned bariatric surgery.  Routine hemodynamic monitoring is recommended. [EKG obtained to assist in diagnosis and management of assessed problem(s)] : EKG obtained to assist in diagnosis and management of assessed problem(s)

## 2025-02-28 NOTE — CARDIOLOGY SUMMARY
[de-identified] : July 12, 2023 normal sinus rhythm, poor R wave progression January 10, 2023 sinus tachycardia February 28, 2025 sinus rhythm

## 2025-02-28 NOTE — HISTORY OF PRESENT ILLNESS
[FreeTextEntry1] : Clara presented to the office today for a cardiovascular evaluation.  She was last seen in the office 6 months ago.  She is now 40 years old, with a history of hypertension.  She has a history of hyperlipidemia.  She has a history of insulin requiring diabetes mellitus, with associated obesity.  She is not known to have any underlying structural heart disease.  She has a history of obstructive sleep apnea, on CPAP.  She has a history of anxiety and depression.  She was admitted in March 2023 with osteomyelitis and wound dehiscence status post left foot transmetatarsal amputation.  At baseline, she tries to exercise, and uses the treadmill and an electric bike.    With regular physical activities, she feels well, without reproducible chest discomfort or shortness of breath suggestive of angina.  She denies orthopnea, PND and lower extremity edema.  She denies palpitations, dizziness and syncope.  She has been following with another cardiologist because of her risk factors.  Apparently several examinations have been done over the years, including stress testing and echocardiography.  She believes those results were favorable. No results have been available.  She saw me in the office in July 2023, having been referred from her gynecologist where her heart rate was elevated.  When I saw her, her heart rate was somewhat accelerated, likely on the basis of anxiety.  I felt that it was likely that her heart rate was more accelerated prior to the visit because of anxiety, and the possibility that she had missed her labetalol.  I did not recommend further testing, noting that I had preferred to wait until records were available.  No records ever arrived.  I saw her in January, 2024 at which time she was planning for bariatric surgery, pending clearances.  I recommended several examinations.  Echocardiography was performed January 30, 2024.  This was a difficult study.  Segmental wall motion abnormalities could not be excluded, despite the use of Definity.  LV systolic function appeared grossly normal.  Nuclear stress testing was performed February 2, 2024.  This revealed basal anterolateral and mid anterolateral defects, which were reversible, suggestive of ischemia.  The ejection fraction was 81%.  Coronary CT angiography was recommended, but was never performed.  I saw her in August, 2024, again in anticipation of bariatric surgery.  We discussed coronary CT angiography.  This was performed September 19, 2024 but was nondiagnostic due to significant cardiac motion.  An exercise stress echo was performed October 11, 2024.  She had a severely decreased functional capacity, prompting termination of the test after 35 seconds, though she did reach a diagnostic heart rate, without evidence of ischemia.  She presents to the office today having been feeling well.  She reports no chest discomfort or shortness of breath suggestive of angina.  She denies orthopnea, PND and lower extremity edema.  She denies palpitations, dizziness and syncope.  She did take her blood pressure medication this morning.  She has been evaluated by the bariatric surgical team, and she is now planning for bariatric surgery, pending clearances.

## 2025-03-12 NOTE — PHYSICAL EXAM
[No Acute Distress] : no acute distress [Well Nourished] : well nourished [Well Developed] : well developed [Well-Appearing] : well-appearing [Normal Sclera/Conjunctiva] : normal sclera/conjunctiva [PERRL] : pupils equal round and reactive to light [EOMI] : extraocular movements intact [Normal Outer Ear/Nose] : the outer ears and nose were normal in appearance [Normal Oropharynx] : the oropharynx was normal [No Lymphadenopathy] : no lymphadenopathy [Supple] : supple [Thyroid Normal, No Nodules] : the thyroid was normal and there were no nodules present [No Respiratory Distress] : no respiratory distress  [No Accessory Muscle Use] : no accessory muscle use [Clear to Auscultation] : lungs were clear to auscultation bilaterally [Normal Rate] : normal rate  [Regular Rhythm] : with a regular rhythm [Normal S1, S2] : normal S1 and S2 [No Murmur] : no murmur heard [Pedal Pulses Present] : the pedal pulses are present [No Edema] : there was no peripheral edema [No Extremity Clubbing/Cyanosis] : no extremity clubbing/cyanosis [Soft] : abdomen soft [Non Tender] : non-tender [Non-distended] : non-distended [No Masses] : no abdominal mass palpated [Normal Bowel Sounds] : normal bowel sounds [Normal Posterior Cervical Nodes] : no posterior cervical lymphadenopathy [Normal Anterior Cervical Nodes] : no anterior cervical lymphadenopathy [No Spinal Tenderness] : no spinal tenderness [No Joint Swelling] : no joint swelling [Grossly Normal Strength/Tone] : grossly normal strength/tone [No Rash] : no rash [Coordination Grossly Intact] : coordination grossly intact [No Focal Deficits] : no focal deficits [Normal Gait] : normal gait [Deep Tendon Reflexes (DTR)] : deep tendon reflexes were 2+ and symmetric [Normal Affect] : the affect was normal [Normal Insight/Judgement] : insight and judgment were intact

## 2025-03-12 NOTE — HISTORY OF PRESENT ILLNESS
[No Pertinent Cardiac History] : no history of aortic stenosis, atrial fibrillation, coronary artery disease, recent myocardial infarction, or implantable device/pacemaker [Sleep Apnea] : sleep apnea [No Adverse Anesthesia Reaction] : no adverse anesthesia reaction in self or family member [Diabetes] : diabetes [(Patient denies any chest pain, claudication, dyspnea on exertion, orthopnea, palpitations or syncope)] : Patient denies any chest pain, claudication, dyspnea on exertion, orthopnea, palpitations or syncope [Moderate (4-6 METs)] : Moderate (4-6 METs) [Chronic Anticoagulation] : no chronic anticoagulation [Chronic Kidney Disease] : no chronic kidney disease [FreeTextEntry1] : Duodenal-Ileal single anastamosis Switch (MAGGY) [FreeTextEntry2] : 3/18/2025 [FreeTextEntry3] : Dr. Chelsea Rodriguez [FreeTextEntry4] : 39 y/o pt with a pmhx of DM2 on insulin, HTN, HLD, PHIL on CPAP presenting for pre-op clearance for her procedure. Pt was cleared by Cardiology Dr. Iverson on 2/28 for her procedure. EKG NSR. Had Lab work done on 3/6/24 that showed normal CBC and CMP with A1C of 7.2. Patient states that she has been compliant with all of her medication and her CPAP machine at night. Pt states that she has no acute complaints today and feels well. Stopped her Mounjaro on Monday of last week.  METS: 4-6  [FreeTextEntry7] : EKG showed NSR on 3/6/2025

## 2025-03-12 NOTE — ASSESSMENT
[Patient Optimized for Surgery] : Patient optimized for surgery [No Further Testing Recommended] : no further testing recommended [As per surgery] : as per surgery [FreeTextEntry4] : Patient Presenting to clinic for MAGGY procedure on 3/18/2024 with Dr. Chelsea Rodriguez. Cardiac clearance completed by Dr. Iverson on 2/28/2025. Lab work and EKG completed on 3/6/2025. RCRI score of 2 for high-risk procedure. Patient is considered High risk for this high-risk procedure. Risks and benefits of the procedure discussed with patient and pt understands. Patient is otherwise optimized medically for her surgery.  #HTN - Blood pressure initially 170/90. Decreased to 146/82 on repeat. Vitals otherwise WNL. - continue on current regimen  #DM II - Has CGM - continue with current insulin and oral regimen  #HLD - Continue Statin  #PHIL - Continue with nightly CPAP

## 2025-04-02 NOTE — ASSESSMENT
[FreeTextEntry1] : #Obesity - Presenting for f/u s/p bariatric surgery 2 weeks ago, doing well - F/u bariatric surgery in 2 weeks for blood work / vitamin levels, currently tolerating puree diet - Continue PPI s/p surgery  #HTN - BP controlled in office today, has improved from prior - Continue lisinopril and labetalol  #T2DM - Following with endo, currently using Dexcom with premeal and basal insulin - Mounjaro and Jardiance on hold until cleared by bariatric surgery to restart, last A1c 6.8%

## 2025-04-02 NOTE — HISTORY OF PRESENT ILLNESS
[FreeTextEntry1] : follow up [de-identified] : 39 y/o pt with a pmhx of DM2 on insulin, HTN, HLD, PHIL on CPAP presenting for follow up. Had bariatric surgery mid-March at [facility], states procedure went well, has been feeling well, is following up with bariatric surgeon in mid-April. Is on vitamin supplementation and started puree diet last night, tolerating well. Labs will be checked at mid-April visit. While in the hospital noted left foot ulcer, was started on IV abx and completed PO course on dc 4 days ago. Recently saw endo Dr. Perlman a few days ago - is currently holding Jardiance and Mounjaro until cleared by bariatric surgery, continuing insulin premeal and basal with Dexcom

## 2025-04-02 NOTE — PHYSICAL EXAM
[Normal] : no joint swelling and grossly normal strength and tone [de-identified] : minimal scarring lower abdomen at laproscopic incision sites, no erythema or weeping

## 2025-05-20 NOTE — CARDIOLOGY SUMMARY
[de-identified] : July 12, 2023 normal sinus rhythm, poor R wave progression January 10, 2023 sinus tachycardia February 28, 2025 sinus rhythm May 20, 2025 sinus rhythm

## 2025-05-20 NOTE — HISTORY OF PRESENT ILLNESS
[FreeTextEntry1] : Clara presented to the office today for a cardiovascular evaluation.  She was last seen in the office 3 months ago.  She is now 40 years old, with a history of hypertension.  She has a history of hyperlipidemia.  She has a history of insulin requiring diabetes mellitus, with associated obesity.  She is not known to have any underlying structural heart disease.  She has a history of obstructive sleep apnea, on CPAP.  She has a history of anxiety and depression.  She was admitted in March 2023 with osteomyelitis and wound dehiscence status post left foot transmetatarsal amputation.  At baseline, she tries to exercise, and uses the treadmill and an electric bike.    With regular physical activities, she feels well, without reproducible chest discomfort or shortness of breath suggestive of angina.  She denies orthopnea, PND and lower extremity edema.  She denies palpitations, dizziness and syncope.  She has been following with another cardiologist because of her risk factors.  Apparently several examinations have been done over the years, including stress testing and echocardiography.  She believes those results were favorable. No results have been available.  She saw me in the office in July 2023, having been referred from her gynecologist where her heart rate was elevated.  When I saw her, her heart rate was somewhat accelerated, likely on the basis of anxiety.  I felt that it was likely that her heart rate was more accelerated prior to the visit because of anxiety, and the possibility that she had missed her labetalol.  I did not recommend further testing, noting that I had preferred to wait until records were available.  No records ever arrived.  I saw her in January, 2024 at which time she was planning for bariatric surgery, pending clearances.  I recommended several examinations.  Echocardiography was performed January 30, 2024.  This was a difficult study.  Segmental wall motion abnormalities could not be excluded, despite the use of Definity.  LV systolic function appeared grossly normal.  Nuclear stress testing was performed February 2, 2024.  This revealed basal anterolateral and mid anterolateral defects, which were reversible, suggestive of ischemia.  The ejection fraction was 81%.  Coronary CT angiography was recommended, but was never performed.  I saw her in August, 2024, again in anticipation of bariatric surgery.  We discussed coronary CT angiography.  This was performed September 19, 2024 but was nondiagnostic due to significant cardiac motion.  An exercise stress echo was performed October 11, 2024.  She had a severely decreased functional capacity, prompting termination of the test after 35 seconds, though she did reach a diagnostic heart rate, without evidence of ischemia.  I saw her in the office in February, 2025 in anticipation of her bariatric procedure.  Her blood pressure was elevated and I increased labetalol.  I considered her otherwise optimized from a cardiovascular perspective for her planned procedure.  Her procedure was ultimately performed without complication.  She presents to the office today having been feeling well.  She was admitted to [facility] for treatment of a left foot ulcer.  She was discharged on p.o. antibiotics for 10 days.  She reports no chest discomfort or shortness of breath suggestive of angina.  She denies orthopnea, PND and lower extremity edema.  She denies palpitations, dizziness and syncope.  She did take her blood pressure medication this morning.  She has been evaluated by the bariatric surgical team, and she is now planning for bariatric surgery, pending clearances.

## 2025-05-20 NOTE — DISCUSSION/SUMMARY
[FreeTextEntry1] : Clara has multiple risk factors for atherosclerosis, but does not have any established heart disease.    Blood work was performed in December 2023.  This revealed a total cholesterol of 245, HDL 48, triglycerides 253, calculated .  Echocardiography was performed January 30, 2024.  This was a difficult study.  Segmental wall motion abnormalities could not be excluded, despite the use of Definity.  LV systolic function appeared grossly normal.  Nuclear stress testing was performed February 2, 2024.  This revealed basal anterolateral and mid anterolateral defects, which were reversible, suggestive of ischemia.  The ejection fraction was 81%.  Coronary CT angiography was performed September 19, 2024 but was nondiagnostic due to significant cardiac motion.  An exercise stress echo was performed October 11, 2024.  She had a severely decreased functional capacity, prompting termination of the test after 35 seconds, though she did reach a diagnostic heart rate, without evidence of ischemia.   Unfortunately, despite her young age, she is quite debilitated, with a very poor exercise capacity.  Some of this is on the basis of anxiety.  She has had a number of cardiovascular examinations, all of which have had some difficulty in their interpretation.  Overall, does not seem as if she has any meaningful underlying structural heart disease.  I do think her exercise capacity is poor, largely on the basis of deconditioning and obesity.  I will increase labetalol up to 300 mg twice daily, given her suboptimal blood pressure control.  I consider her otherwise optimized from a cardiovascular perspective for her planned bariatric surgery.  Routine hemodynamic monitoring is recommended.

## 2025-06-01 NOTE — ASSESSMENT
[FreeTextEntry1] : Patient is a 39yo with a PMH of HTN, HLD, T2DM, Anxiety/Depression, Obesity s/p MAGGY-s on 3/18/25 (compliant with vitamins) who presents for a hospital follow up.   #Pyelonephritis/LINDA  - Resolving - Cr 0.79 on discharge from hospital  - Continue Vantin 200mg BID until 6/5 - Referral for home health aid placed  #B/l non obstructing Kidney Stones  - Did not pass stones yet - Continue to stay hydrated  - F/u with Urology Dr. Hunter  #Foot Wound - Continue local wound care - Continue Minocycline 100mg BID  - F/u with Pod Dr. Abebe   #T2DM - A1c 6.2 in hospital  - Continue Insulin pump - F/u with Dr. Perlman   #HTN - BP stable - Continue Labetalol and Lisinopril    #Depression - Continue Citalopram   #HLD - Continue Atorvastatin and Ezetimibe

## 2025-06-01 NOTE — HISTORY OF PRESENT ILLNESS
[Post-hospitalization from ___ Hospital] : Post-hospitalization from [unfilled] Hospital [Admitted on: ___] : The patient was admitted on [unfilled] [Discharged on ___] : discharged on [unfilled] [Discharge Summary] : discharge summary [Pertinent Labs] : pertinent labs [Radiology Findings] : radiology findings [Discharge Med List] : discharge medication list [Med Reconciliation] : medication reconciliation has been completed [FreeTextEntry2] : Patient is a 41 yo with a PMH of HTN, HLD, T2DM, Anxiety/Depression, Obesity s/p MAGGY-s on 3/18/25 (compliant with vitamins) who presents for a hospital follow up. Patient was admitted to [facility] on 5/25 - 5/27 for pyelonephritis. CT abdomen revealed b/l nephrolithiasis w/o obstruction and R sided pyelonephritis. Labs significant for LINDA. Urine and blood cx negative. Patient was treated with IV Rocephin and IVF and improved. Discharged on 5/27 on a 10d course of Vantin 200mg BID. L foot wound without signs of osteomyelitis. Podiatry evaluated and decided no surgical intervention needed at the time.  Today patient is feeling much better. Patient does not think the kidney stones have passed yet, but denies any more blood in urine or pain. No concerns or complaints. Requests home health aid referral.

## 2025-06-01 NOTE — PHYSICAL EXAM
[No Acute Distress] : no acute distress [Well Nourished] : well nourished [Well Developed] : well developed [Well-Appearing] : well-appearing [Normal Sclera/Conjunctiva] : normal sclera/conjunctiva [PERRL] : pupils equal round and reactive to light [EOMI] : extraocular movements intact [Normal Outer Ear/Nose] : the outer ears and nose were normal in appearance [No JVD] : no jugular venous distention [No Lymphadenopathy] : no lymphadenopathy [Supple] : supple [Thyroid Normal, No Nodules] : the thyroid was normal and there were no nodules present [No Respiratory Distress] : no respiratory distress  [No Accessory Muscle Use] : no accessory muscle use [Clear to Auscultation] : lungs were clear to auscultation bilaterally [Normal Rate] : normal rate  [Normal S1, S2] : normal S1 and S2 [Regular Rhythm] : with a regular rhythm [No Murmur] : no murmur heard [Pedal Pulses Present] : the pedal pulses are present [No Edema] : there was no peripheral edema [No Extremity Clubbing/Cyanosis] : no extremity clubbing/cyanosis [Soft] : abdomen soft [Non Tender] : non-tender [Non-distended] : non-distended [No Masses] : no abdominal mass palpated [No HSM] : no HSM [Normal Bowel Sounds] : normal bowel sounds [No CVA Tenderness] : no CVA  tenderness [No Joint Swelling] : no joint swelling [Grossly Normal Strength/Tone] : grossly normal strength/tone [No Rash] : no rash [Coordination Grossly Intact] : coordination grossly intact [No Focal Deficits] : no focal deficits [Normal Gait] : normal gait [Deep Tendon Reflexes (DTR)] : deep tendon reflexes were 2+ and symmetric [Normal Affect] : the affect was normal [Normal Insight/Judgement] : insight and judgment were intact [Alert and Oriented x3] : oriented to person, place, and time [Normal Mood] : the mood was normal [de-identified] : Chronic L foot wound

## 2025-06-01 NOTE — HISTORY OF PRESENT ILLNESS
[Post-hospitalization from ___ Hospital] : Post-hospitalization from [unfilled] Hospital [Admitted on: ___] : The patient was admitted on [unfilled] [Discharged on ___] : discharged on [unfilled] [Discharge Summary] : discharge summary [Pertinent Labs] : pertinent labs [Radiology Findings] : radiology findings [Discharge Med List] : discharge medication list [Med Reconciliation] : medication reconciliation has been completed [FreeTextEntry2] : Patient is a 39 yo with a PMH of HTN, HLD, T2DM, Anxiety/Depression, Obesity s/p MAGGY-s on 3/18/25 (compliant with vitamins) who presents for a hospital follow up. Patient was admitted to [facility] on 5/25 - 5/27 for pyelonephritis. CT abdomen revealed b/l nephrolithiasis w/o obstruction and R sided pyelonephritis. Labs significant for LINDA. Urine and blood cx negative. Patient was treated with IV Rocephin and IVF and improved. Discharged on 5/27 on a 10d course of Vantin 200mg BID. L foot wound without signs of osteomyelitis. Podiatry evaluated and decided no surgical intervention needed at the time.  Today patient is feeling much better. Patient does not think the kidney stones have passed yet, but denies any more blood in urine or pain. No concerns or complaints. Requests home health aid referral.

## 2025-06-01 NOTE — PHYSICAL EXAM
[Well Nourished] : well nourished [No Acute Distress] : no acute distress [Well Developed] : well developed [Well-Appearing] : well-appearing [Normal Sclera/Conjunctiva] : normal sclera/conjunctiva [PERRL] : pupils equal round and reactive to light [EOMI] : extraocular movements intact [Normal Outer Ear/Nose] : the outer ears and nose were normal in appearance [No JVD] : no jugular venous distention [No Lymphadenopathy] : no lymphadenopathy [Supple] : supple [Thyroid Normal, No Nodules] : the thyroid was normal and there were no nodules present [No Respiratory Distress] : no respiratory distress  [No Accessory Muscle Use] : no accessory muscle use [Clear to Auscultation] : lungs were clear to auscultation bilaterally [Normal Rate] : normal rate  [Normal S1, S2] : normal S1 and S2 [Regular Rhythm] : with a regular rhythm [No Murmur] : no murmur heard [Pedal Pulses Present] : the pedal pulses are present [No Edema] : there was no peripheral edema [No Extremity Clubbing/Cyanosis] : no extremity clubbing/cyanosis [Non Tender] : non-tender [Soft] : abdomen soft [Non-distended] : non-distended [No Masses] : no abdominal mass palpated [No HSM] : no HSM [Normal Bowel Sounds] : normal bowel sounds [No CVA Tenderness] : no CVA  tenderness [No Joint Swelling] : no joint swelling [Grossly Normal Strength/Tone] : grossly normal strength/tone [No Rash] : no rash [Coordination Grossly Intact] : coordination grossly intact [No Focal Deficits] : no focal deficits [Normal Gait] : normal gait [Deep Tendon Reflexes (DTR)] : deep tendon reflexes were 2+ and symmetric [Normal Affect] : the affect was normal [Normal Insight/Judgement] : insight and judgment were intact [Alert and Oriented x3] : oriented to person, place, and time [Normal Mood] : the mood was normal [de-identified] : Chronic L foot wound